# Patient Record
Sex: MALE | Race: WHITE | NOT HISPANIC OR LATINO | Employment: FULL TIME | URBAN - METROPOLITAN AREA
[De-identification: names, ages, dates, MRNs, and addresses within clinical notes are randomized per-mention and may not be internally consistent; named-entity substitution may affect disease eponyms.]

---

## 2022-05-03 ENCOUNTER — TELEPHONE (OUTPATIENT)
Dept: ORTHOPEDICS | Facility: CLINIC | Age: 37
End: 2022-05-03

## 2022-05-03 ENCOUNTER — PRE VISIT (OUTPATIENT)
Dept: ORTHOPEDICS | Facility: CLINIC | Age: 37
End: 2022-05-03

## 2022-05-03 ENCOUNTER — VIRTUAL VISIT (OUTPATIENT)
Dept: ORTHOPEDICS | Facility: CLINIC | Age: 37
End: 2022-05-03
Payer: COMMERCIAL

## 2022-05-03 DIAGNOSIS — D18.09 HEMANGIOMA OF OTHER SITES: Primary | ICD-10-CM

## 2022-05-03 PROCEDURE — 99202 OFFICE O/P NEW SF 15 MIN: CPT | Mod: TEL | Performed by: ORTHOPAEDIC SURGERY

## 2022-05-03 NOTE — LETTER
Date:May 8, 2022      Provider requested that no letter be sent. Do not send.       [facility]

## 2022-05-03 NOTE — PROGRESS NOTES
Patient is a 36-year-old male who was seen for a right thigh mass.  He noticed this in September 2021 when he was using his leg in a different way on the new job.  He describes it as a knot.  Since that time the knot has persistent and approximately once a month ago he feels perhaps the knot is enlarging.    He also recently has had hip and back pain.  This is being managed through his friend who is a chiropractor and has resolved.    In addition to the right thigh nodule he describes a left rib cage nodule and a nodule on his forehead.  He is interested in me evaluating all the nodules not just the right thigh nodule.    His past medical history as I can ascertain it from the chart as unremarkable.    I reviewed his x-rays and MRI scan and ultrasound as well as the reports for the MRI scan and the ultrasound.  In summary these are suspicious for my review for a benign vascular tumor involving the right vessels medialis muscle.  Sarcoma probably cannot be excluded based solely on imaging.    I recommended to Angel that he have an excisional biopsy of the right thigh mass.  He would like me to consider simultaneous excision of the left chest mass as well as a forehead mass.  I told him I could not remove the forehead mass but would be willing to evaluate him for the chest mass and a face-to-face encounter.  He is in agreement with this.    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    Plan: 1.  John Mathews or Gloria will contact the patient to set up an appointment to see me in the next 2 to 3 weeks on a Thursday clinic.  At that time we will discuss the proposed surgery for his right thigh and determine if we can excise the left chest nodule without advanced imaging.    This new patient visit began at 3:18 PM with me reviewing the image to 3:24 PM.  The visit then by telephone plus documentation was from 3:24 PM to 3:35 PM total visit  17 minutes.

## 2022-05-03 NOTE — TELEPHONE ENCOUNTER
RN called and spoke with :Javier.  NASIR set up an appt on  05-19-22 at 3:45 to discuss surgery going forward.

## 2022-05-03 NOTE — LETTER
5/3/2022         RE: Javier Blood  [address] [facility] [address]        Dear Colleague,    Thank you for referring your patient, Javier Blood, to the [facility] [place]. Please see a copy of my visit note below.    Patient is a 36-year-old male who was seen for a right thigh mass.  He noticed this in September 2021 when he was using his leg in a different way on the new job.  He describes it as a knot.  Since that time the knot has persistent and approximately once a month ago he feels perhaps the knot is enlarging.    He also recently has had hip and back pain.  This is being managed through his friend who is a chiropractor and has resolved.    In addition to the right thigh nodule he describes a left rib cage nodule and a nodule on his forehead.  He is interested in me evaluating all the nodules not just the right thigh nodule.    His past medical history as I can ascertain it from the chart as unremarkable.    I reviewed his x-rays and MRI scan and ultrasound as well as the reports for the MRI scan and the ultrasound.  In summary these are suspicious for my review for a benign vascular tumor involving the right vessels medialis muscle.  Sarcoma probably cannot be excluded based solely on imaging.    I recommended to Angel that he have an excisional biopsy of the right thigh mass.  He would like me to consider simultaneous excision of the left chest mass as well as a forehead mass.  I told him I could not remove the forehead mass but would be willing to evaluate him for the chest mass and a face-to-face encounter.  He is in agreement with this.    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    Plan: 1.  John Mathews or Gloria will contact the patient to set up an appointment to see me in the next 2 to 3 weeks on a Thursday clinic.  At that time we will discuss the proposed surgery for  his right thigh and determine if we can excise the left chest nodule without advanced imaging.    This new patient visit began at 3:18 PM with me reviewing the image to 3:24 PM.  The visit then by telephone plus documentation was from 3:24 PM to 3:35 PM total visit 17 minutes.          Again, thank you for allowing me to participate in the care of your patient.        Sincerely,        Boo Perez MD

## 2022-05-03 NOTE — PATIENT INSTRUCTIONS
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    Plan: 1.  John Mathews or Gloria will contact the patient to set up an appointment to see me in the next 2 to 3 weeks on a Thursday clinic.  At that time we will discuss the proposed surgery for his right thigh and determine if we can excise the left chest nodule without advanced imaging.

## 2022-05-03 NOTE — NURSING NOTE
Reason For Visit:   Chief Complaint   Patient presents with     Consult     Right vastus lateralis. Started a new job 9/2021, pt started a more physical job and thought the lump was just adjusting.        There were no vitals taken for this visit.    Pain Assessment  Patient Currently in Pain: Yes  0-10 Pain Scale: 5  Primary Pain Location: Leg    Susana Camejo ATC

## 2022-05-04 NOTE — TELEPHONE ENCOUNTER
DIAGNOSIS: Right vastus lateralis    APPOINTMENT DATE: 5/3/22   NOTES STATUS DETAILS   OFFICE NOTE from other specialist Care Everywhere 4/21/22 OV Carolina Vance PA-C  ([facility] )    3/25/22 urgent care note Negro West MD  ([facility])    MEDICATION LIST Care Everywhere    LABS     CBC/DIFF Care Everywhere 3/30/22   MRI Received 4/7/22 MR Femur    XRAYS (IMAGES & REPORTS) Received 4/21/22 XR Hip, Pelvis, Lumbar, Femur

## 2022-05-19 ENCOUNTER — PREP FOR PROCEDURE (OUTPATIENT)
Dept: ORTHOPEDICS | Facility: CLINIC | Age: 37
End: 2022-05-19

## 2022-05-19 ENCOUNTER — OFFICE VISIT (OUTPATIENT)
Dept: ORTHOPEDICS | Facility: CLINIC | Age: 37
End: 2022-05-19
Payer: COMMERCIAL

## 2022-05-19 DIAGNOSIS — D17.9 INTRAMUSCULAR LIPOMA: Primary | ICD-10-CM

## 2022-05-19 DIAGNOSIS — D18.09 HEMANGIOMA OF OTHER SITES: Primary | ICD-10-CM

## 2022-05-19 PROCEDURE — 99213 OFFICE O/P EST LOW 20 MIN: CPT | Performed by: ORTHOPAEDIC SURGERY

## 2022-05-19 NOTE — LETTER
Date:May 22, 2022      Provider requested that no letter be sent. Do not send.       [facility]

## 2022-05-19 NOTE — LETTER
5/19/2022         RE: Javier Blood  [address] [place] [address]        Dear Colleague,    Thank you for referring your patient, Javier Blood, to the [facility] [place]. Please see a copy of my visit note below.    Patient is a 36-year-old male who was recently interviewed by remote visit and was asked to come in today for physical exam and to discuss the recommendations.    In summary he was seen initially for mass in the right vastus lateralis muscle which is thought to be a benign vascular tumor however to my impression is not 100% clear on MRI imaging.  In addition during his initial visit he mentioned to right forehead lesion and a left flank lesion.    On physical exam today the mass in the thigh is palpable.  It is clinically consistent with a small tumor located deeply in the vastus lateralis muscle.  The left flank region is a tumor measuring approximately 5 cm and subcutaneous soft mobile and clinically consistent with a subcutaneous lipoma.  The lesion in the right forehead is approximately 1 cm in its subcutaneous incision the right side of the forehead is nontender to palpation is not erythematous.    I reviewed the MR scan with the patient and his wife.    We had a fairly lengthy discussion about the best management for the 3 tumors.  Regarding the forehead tumor I told him that there was outside of my specialty care but since it has not been enlarging I think observation would be reasonable.  Alternatively he could return to his primary care physician for a determination on surgeon who would excise that.    Regarding the left flank lipoma he would like the tumor removed he reports it is symptomatic.  His wife confirms this.    Regarding the right thigh tumor.  Is my recommendation that it be at a minimum biopsy Boo of maximum excised to establish the diagnosis.  After exploring the difference between an image guided needle biopsy and surgical  excision the patient and his wife favor proceeding with surgical excision.    Impression: 3 lesions.  The forehead lesion will be followed up through his primary clinic if it enlarges.  The left flank lesion will be removed at surgery as will the right thigh tumor.    Plan: 1.  Case request form completed.  We will excise the right thigh and left flank tumors    Although the patient and family's questions were answered.  Today's visit is an established patient and including review of imaging and documentation was greater than 20-minute      Again, thank you for allowing me to participate in the care of your patient.        Sincerely,        Boo Perez MD

## 2022-05-19 NOTE — NURSING NOTE
Teaching Flowsheet removal right thigh tumor. And removal left flank tumor.  Relevant Diagnosis:   Teaching Topic: preop     Person(s) involved in teaching:   Patient and Wife     Motivation Level:  Asks Questions: Yes  Eager to Learn: Yes  Cooperative: Yes  Receptive (willing/able to accept information): Yes  Any cultural factors/Orthodox beliefs that may influence understanding or compliance? No       Patient and Family demonstrates understanding of the following:  Reason for the appointment, diagnosis and treatment plan: Yes  Knowledge of proper use of medications and conditions for which they are ordered (with special attention to potential side effects or drug interactions): Yes  Which situations necessitate calling provider and whom to contact: Yes       Teaching Concerns Addressed:        Proper use and care of soap (medical equip, care aids, etc.): Yes  Nutritional needs and diet plan: Yes  Pain management techniques: Yes  Wound Care: Yes  How and/when to access community resources: Yes     Instructional Materials Used/Given: surgery packet reviewed with patient and his wife.  They will obtain H&P with PCP.  Spouse will  Drive him and take him home after surgery.  They have no other questions at this time.  Will await PAN and covid calls.

## 2022-05-19 NOTE — NURSING NOTE
Chief Complaint   Patient presents with     RECHECK     Followup right thigh // discuss surgery        36 year old  1985               Pain Assessment  Patient Currently in Pain: Yes  0-10 Pain Scale: 1  Primary Pain Location: Back (right lower back)             [id number] - [place], [place] - [address] [place], [place] [address] AT [facility]        No Known Allergies        Current Outpatient Medications   Medication     sertraline (ZOLOFT) 50 MG tablet     No current facility-administered medications for this visit.

## 2022-05-20 NOTE — PATIENT INSTRUCTIONS
Impression: 3 lesions.  The forehead lesion will be followed up through his primary clinic if it enlarges.  The left flank lesion will be removed at surgery as will the right thigh tumor.    Plan: 1.  Case request form completed.  We will excise the right thigh and left flank tumors

## 2022-05-20 NOTE — PROGRESS NOTES
Patient is a 36-year-old male who was recently interviewed by remote visit and was asked to come in today for physical exam and to discuss the recommendations.    In summary he was seen initially for mass in the right vastus lateralis muscle which is thought to be a benign vascular tumor however to my impression is not 100% clear on MRI imaging.  In addition during his initial visit he mentioned to right forehead lesion and a left flank lesion.    On physical exam today the mass in the thigh is palpable.  It is clinically consistent with a small tumor located deeply in the vastus lateralis muscle.  The left flank region is a tumor measuring approximately 5 cm and subcutaneous soft mobile and clinically consistent with a subcutaneous lipoma.  The lesion in the right forehead is approximately 1 cm in its subcutaneous incision the right side of the forehead is nontender to palpation is not erythematous.    I reviewed the MR scan with the patient and his wife.    We had a fairly lengthy discussion about the best management for the 3 tumors.  Regarding the forehead tumor I told him that there was outside of my specialty care but since it has not been enlarging I think observation would be reasonable.  Alternatively he could return to his primary care physician for a determination on surgeon who would excise that.    Regarding the left flank lipoma he would like the tumor removed he reports it is symptomatic.  His wife confirms this.    Regarding the right thigh tumor.  Is my recommendation that it be at a minimum biopsy Boo of maximum excised to establish the diagnosis.  After exploring the difference between an image guided needle biopsy and surgical excision the patient and his wife favor proceeding with surgical excision.    Impression: 3 lesions.  The forehead lesion will be followed up through his primary clinic if it enlarges.  The left flank lesion will be removed at surgery as will the right thigh  tumor.    Plan: 1.  Case request form completed.  We will excise the right thigh and left flank tumors    Although the patient and family's questions were answered.  Today's visit is an established patient and including review of imaging and documentation was greater than 20-minute

## 2022-05-23 ENCOUNTER — TELEPHONE (OUTPATIENT)
Dept: ORTHOPEDICS | Facility: CLINIC | Age: 37
End: 2022-05-23
Payer: COMMERCIAL

## 2022-05-23 NOTE — TELEPHONE ENCOUNTER
Called and left voicemail for patient about scheduling surgery with Dr. Perez. Gave [phone] as call back number.

## 2022-05-25 PROBLEM — D17.9 INTRAMUSCULAR LIPOMA: Status: ACTIVE | Noted: 2022-05-25

## 2022-05-25 NOTE — TELEPHONE ENCOUNTER
Patient is scheduled for surgery with Dr. Perez    Spoke with: Javier    Date of Surgery: 7/15/2022    Location: [facility]    Informed patient they will need an adult  yes    Pre op with Provider n/a    H&P: Scheduled with pcp    Pre-procedure COVID-19 Test: [place] on 7/11/2022    Additional imaging/appointments: n/a    Surgery packet: Given in clinic     Additional comments: n/a

## 2022-08-04 ENCOUNTER — ANESTHESIA EVENT (OUTPATIENT)
Dept: SURGERY | Facility: AMBULATORY SURGERY CENTER | Age: 37
End: 2022-08-04
Payer: COMMERCIAL

## 2022-08-05 ENCOUNTER — HOSPITAL ENCOUNTER (OUTPATIENT)
Facility: AMBULATORY SURGERY CENTER | Age: 37
Discharge: HOME | End: 2022-08-05
Attending: ORTHOPAEDIC SURGERY
Payer: COMMERCIAL

## 2022-08-05 ENCOUNTER — ANESTHESIA (OUTPATIENT)
Dept: SURGERY | Facility: AMBULATORY SURGERY CENTER | Age: 37
End: 2022-08-05
Payer: COMMERCIAL

## 2022-08-05 VITALS
BODY MASS INDEX: 25.62 KG/M2 | HEIGHT: 71 IN | TEMPERATURE: 97.4 F | HEART RATE: 64 BPM | DIASTOLIC BLOOD PRESSURE: 65 MMHG | RESPIRATION RATE: 12 BRPM | OXYGEN SATURATION: 96 % | WEIGHT: 183 LBS | SYSTOLIC BLOOD PRESSURE: 108 MMHG

## 2022-08-05 DIAGNOSIS — D17.9 INTRAMUSCULAR LIPOMA: Primary | ICD-10-CM

## 2022-08-05 PROCEDURE — 27339 EXC THIGH/KNEE TUM DEP 5CM/>: CPT | Mod: RT

## 2022-08-05 PROCEDURE — 88307 TISSUE EXAM BY PATHOLOGIST: CPT | Mod: TC | Performed by: ORTHOPAEDIC SURGERY

## 2022-08-05 PROCEDURE — 21931 EXC BACK LES SC 3 CM/>: CPT | Mod: LT

## 2022-08-05 PROCEDURE — 88307 TISSUE EXAM BY PATHOLOGIST: CPT | Mod: 26 | Performed by: PATHOLOGY

## 2022-08-05 PROCEDURE — 88304 TISSUE EXAM BY PATHOLOGIST: CPT | Mod: 26 | Performed by: PATHOLOGY

## 2022-08-05 PROCEDURE — 27339 EXC THIGH/KNEE TUM DEP 5CM/>: CPT | Mod: RT | Performed by: ORTHOPAEDIC SURGERY

## 2022-08-05 PROCEDURE — 88304 TISSUE EXAM BY PATHOLOGIST: CPT | Mod: TC | Performed by: ORTHOPAEDIC SURGERY

## 2022-08-05 PROCEDURE — 21931 EXC BACK LES SC 3 CM/>: CPT | Mod: LT | Performed by: ORTHOPAEDIC SURGERY

## 2022-08-05 RX ORDER — FENTANYL CITRATE 50 UG/ML
INJECTION, SOLUTION INTRAMUSCULAR; INTRAVENOUS PRN
Status: DISCONTINUED | OUTPATIENT
Start: 2022-08-05 | End: 2022-08-05

## 2022-08-05 RX ORDER — OXYCODONE HYDROCHLORIDE 5 MG/1
5 TABLET ORAL EVERY 4 HOURS PRN
Status: DISCONTINUED | OUTPATIENT
Start: 2022-08-05 | End: 2022-08-06 | Stop reason: HOSPADM

## 2022-08-05 RX ORDER — CEFAZOLIN SODIUM 2 G/50ML
2 SOLUTION INTRAVENOUS
Status: COMPLETED | OUTPATIENT
Start: 2022-08-05 | End: 2022-08-05

## 2022-08-05 RX ORDER — SODIUM CHLORIDE, SODIUM LACTATE, POTASSIUM CHLORIDE, CALCIUM CHLORIDE 600; 310; 30; 20 MG/100ML; MG/100ML; MG/100ML; MG/100ML
INJECTION, SOLUTION INTRAVENOUS CONTINUOUS
Status: DISCONTINUED | OUTPATIENT
Start: 2022-08-05 | End: 2022-08-05 | Stop reason: HOSPADM

## 2022-08-05 RX ORDER — AMOXICILLIN 250 MG
1-2 CAPSULE ORAL 2 TIMES DAILY
Qty: 30 TABLET | Refills: 0 | Status: SHIPPED | OUTPATIENT
Start: 2022-08-05

## 2022-08-05 RX ORDER — ACETAMINOPHEN 325 MG/1
650 TABLET ORAL
Status: DISCONTINUED | OUTPATIENT
Start: 2022-08-05 | End: 2022-08-06 | Stop reason: HOSPADM

## 2022-08-05 RX ORDER — ALBUTEROL SULFATE 0.83 MG/ML
2.5 SOLUTION RESPIRATORY (INHALATION) EVERY 4 HOURS PRN
Status: DISCONTINUED | OUTPATIENT
Start: 2022-08-05 | End: 2022-08-05 | Stop reason: HOSPADM

## 2022-08-05 RX ORDER — HYDROMORPHONE HYDROCHLORIDE 1 MG/ML
0.4 INJECTION, SOLUTION INTRAMUSCULAR; INTRAVENOUS; SUBCUTANEOUS EVERY 10 MIN PRN
Status: DISCONTINUED | OUTPATIENT
Start: 2022-08-05 | End: 2022-08-05 | Stop reason: HOSPADM

## 2022-08-05 RX ORDER — ONDANSETRON 4 MG/1
4 TABLET, ORALLY DISINTEGRATING ORAL EVERY 30 MIN PRN
Status: DISCONTINUED | OUTPATIENT
Start: 2022-08-05 | End: 2022-08-06 | Stop reason: HOSPADM

## 2022-08-05 RX ORDER — FENTANYL CITRATE 50 UG/ML
50 INJECTION, SOLUTION INTRAMUSCULAR; INTRAVENOUS EVERY 5 MIN PRN
Status: DISCONTINUED | OUTPATIENT
Start: 2022-08-05 | End: 2022-08-05 | Stop reason: HOSPADM

## 2022-08-05 RX ORDER — ACETAMINOPHEN 325 MG/1
650 TABLET ORAL EVERY 4 HOURS PRN
Qty: 100 TABLET | Refills: 0 | Status: SHIPPED | OUTPATIENT
Start: 2022-08-05

## 2022-08-05 RX ORDER — LIDOCAINE HYDROCHLORIDE 20 MG/ML
INJECTION, SOLUTION INFILTRATION; PERINEURAL PRN
Status: DISCONTINUED | OUTPATIENT
Start: 2022-08-05 | End: 2022-08-05

## 2022-08-05 RX ORDER — LIDOCAINE 40 MG/G
CREAM TOPICAL
Status: DISCONTINUED | OUTPATIENT
Start: 2022-08-05 | End: 2022-08-05 | Stop reason: HOSPADM

## 2022-08-05 RX ORDER — PROPOFOL 10 MG/ML
INJECTION, EMULSION INTRAVENOUS CONTINUOUS PRN
Status: DISCONTINUED | OUTPATIENT
Start: 2022-08-05 | End: 2022-08-05

## 2022-08-05 RX ORDER — BUPIVACAINE HYDROCHLORIDE AND EPINEPHRINE 5; 5 MG/ML; UG/ML
INJECTION, SOLUTION PERINEURAL PRN
Status: DISCONTINUED | OUTPATIENT
Start: 2022-08-05 | End: 2022-08-05 | Stop reason: HOSPADM

## 2022-08-05 RX ORDER — GABAPENTIN 300 MG/1
300 CAPSULE ORAL
Status: COMPLETED | OUTPATIENT
Start: 2022-08-05 | End: 2022-08-05

## 2022-08-05 RX ORDER — ONDANSETRON 2 MG/ML
4 INJECTION INTRAMUSCULAR; INTRAVENOUS EVERY 30 MIN PRN
Status: DISCONTINUED | OUTPATIENT
Start: 2022-08-05 | End: 2022-08-06 | Stop reason: HOSPADM

## 2022-08-05 RX ORDER — ACETAMINOPHEN 325 MG/1
975 TABLET ORAL ONCE
Status: COMPLETED | OUTPATIENT
Start: 2022-08-05 | End: 2022-08-05

## 2022-08-05 RX ORDER — OXYCODONE HYDROCHLORIDE 5 MG/1
5 TABLET ORAL EVERY 6 HOURS PRN
Qty: 15 TABLET | Refills: 0 | Status: SHIPPED | OUTPATIENT
Start: 2022-08-05 | End: 2022-08-08

## 2022-08-05 RX ORDER — KETOROLAC TROMETHAMINE 30 MG/ML
INJECTION, SOLUTION INTRAMUSCULAR; INTRAVENOUS PRN
Status: DISCONTINUED | OUTPATIENT
Start: 2022-08-05 | End: 2022-08-05

## 2022-08-05 RX ORDER — OXYCODONE HYDROCHLORIDE 5 MG/1
5 TABLET ORAL
Status: COMPLETED | OUTPATIENT
Start: 2022-08-05 | End: 2022-08-05

## 2022-08-05 RX ORDER — PROPOFOL 10 MG/ML
INJECTION, EMULSION INTRAVENOUS PRN
Status: DISCONTINUED | OUTPATIENT
Start: 2022-08-05 | End: 2022-08-05

## 2022-08-05 RX ORDER — EPHEDRINE SULFATE 50 MG/ML
INJECTION, SOLUTION INTRAMUSCULAR; INTRAVENOUS; SUBCUTANEOUS PRN
Status: DISCONTINUED | OUTPATIENT
Start: 2022-08-05 | End: 2022-08-05

## 2022-08-05 RX ORDER — CEFAZOLIN SODIUM 2 G/50ML
2 SOLUTION INTRAVENOUS SEE ADMIN INSTRUCTIONS
Status: DISCONTINUED | OUTPATIENT
Start: 2022-08-05 | End: 2022-08-05 | Stop reason: HOSPADM

## 2022-08-05 RX ORDER — ONDANSETRON 2 MG/ML
INJECTION INTRAMUSCULAR; INTRAVENOUS PRN
Status: DISCONTINUED | OUTPATIENT
Start: 2022-08-05 | End: 2022-08-05

## 2022-08-05 RX ORDER — DEXAMETHASONE SODIUM PHOSPHATE 4 MG/ML
INJECTION, SOLUTION INTRA-ARTICULAR; INTRALESIONAL; INTRAMUSCULAR; INTRAVENOUS; SOFT TISSUE PRN
Status: DISCONTINUED | OUTPATIENT
Start: 2022-08-05 | End: 2022-08-05

## 2022-08-05 RX ORDER — HALOPERIDOL 5 MG/ML
1 INJECTION INTRAMUSCULAR
Status: DISCONTINUED | OUTPATIENT
Start: 2022-08-05 | End: 2022-08-06 | Stop reason: HOSPADM

## 2022-08-05 RX ORDER — METHOCARBAMOL 750 MG/1
750 TABLET, FILM COATED ORAL
Status: DISCONTINUED | OUTPATIENT
Start: 2022-08-05 | End: 2022-08-06 | Stop reason: HOSPADM

## 2022-08-05 RX ORDER — DEXMEDETOMIDINE HYDROCHLORIDE 4 UG/ML
INJECTION, SOLUTION INTRAVENOUS PRN
Status: DISCONTINUED | OUTPATIENT
Start: 2022-08-05 | End: 2022-08-05

## 2022-08-05 RX ADMIN — EPHEDRINE SULFATE 10 MG: 50 INJECTION, SOLUTION INTRAMUSCULAR; INTRAVENOUS; SUBCUTANEOUS at 13:37

## 2022-08-05 RX ADMIN — ACETAMINOPHEN 975 MG: 325 TABLET ORAL at 11:17

## 2022-08-05 RX ADMIN — PROPOFOL 200 MG: 10 INJECTION, EMULSION INTRAVENOUS at 13:01

## 2022-08-05 RX ADMIN — CEFAZOLIN SODIUM 2 G: 2 SOLUTION INTRAVENOUS at 12:53

## 2022-08-05 RX ADMIN — Medication 50 MG: at 13:02

## 2022-08-05 RX ADMIN — PROPOFOL 50 MG: 10 INJECTION, EMULSION INTRAVENOUS at 13:30

## 2022-08-05 RX ADMIN — Medication 100 MCG: at 13:21

## 2022-08-05 RX ADMIN — DEXMEDETOMIDINE HYDROCHLORIDE 8 MCG: 4 INJECTION, SOLUTION INTRAVENOUS at 13:23

## 2022-08-05 RX ADMIN — PROPOFOL 200 MCG/KG/MIN: 10 INJECTION, EMULSION INTRAVENOUS at 13:05

## 2022-08-05 RX ADMIN — FENTANYL CITRATE 25 MCG: 50 INJECTION, SOLUTION INTRAMUSCULAR; INTRAVENOUS at 13:00

## 2022-08-05 RX ADMIN — DEXMEDETOMIDINE HYDROCHLORIDE 8 MCG: 4 INJECTION, SOLUTION INTRAVENOUS at 14:05

## 2022-08-05 RX ADMIN — ONDANSETRON 4 MG: 2 INJECTION INTRAMUSCULAR; INTRAVENOUS at 13:20

## 2022-08-05 RX ADMIN — LIDOCAINE HYDROCHLORIDE 100 MG: 20 INJECTION, SOLUTION INFILTRATION; PERINEURAL at 13:01

## 2022-08-05 RX ADMIN — FENTANYL CITRATE 50 MCG: 50 INJECTION, SOLUTION INTRAMUSCULAR; INTRAVENOUS at 15:33

## 2022-08-05 RX ADMIN — KETOROLAC TROMETHAMINE 30 MG: 30 INJECTION, SOLUTION INTRAMUSCULAR; INTRAVENOUS at 14:35

## 2022-08-05 RX ADMIN — DEXAMETHASONE SODIUM PHOSPHATE 4 MG: 4 INJECTION, SOLUTION INTRA-ARTICULAR; INTRALESIONAL; INTRAMUSCULAR; INTRAVENOUS; SOFT TISSUE at 13:20

## 2022-08-05 RX ADMIN — SODIUM CHLORIDE, SODIUM LACTATE, POTASSIUM CHLORIDE, CALCIUM CHLORIDE: 600; 310; 30; 20 INJECTION, SOLUTION INTRAVENOUS at 11:32

## 2022-08-05 RX ADMIN — DEXMEDETOMIDINE HYDROCHLORIDE 4 MCG: 4 INJECTION, SOLUTION INTRAVENOUS at 13:27

## 2022-08-05 RX ADMIN — Medication 100 MCG: at 13:35

## 2022-08-05 RX ADMIN — FENTANYL CITRATE 50 MCG: 50 INJECTION, SOLUTION INTRAMUSCULAR; INTRAVENOUS at 15:24

## 2022-08-05 RX ADMIN — OXYCODONE HYDROCHLORIDE 5 MG: 5 TABLET ORAL at 15:31

## 2022-08-05 RX ADMIN — PROPOFOL 50 MG: 10 INJECTION, EMULSION INTRAVENOUS at 13:55

## 2022-08-05 RX ADMIN — Medication 100 MCG: at 13:26

## 2022-08-05 RX ADMIN — FENTANYL CITRATE 50 MCG: 50 INJECTION, SOLUTION INTRAMUSCULAR; INTRAVENOUS at 13:13

## 2022-08-05 RX ADMIN — FENTANYL CITRATE 25 MCG: 50 INJECTION, SOLUTION INTRAMUSCULAR; INTRAVENOUS at 13:08

## 2022-08-05 RX ADMIN — GABAPENTIN 300 MG: 300 CAPSULE ORAL at 11:16

## 2022-08-05 NOTE — OR NURSING
Patient Javier Blood has been under the care of the [facility]. He may return to work August 11, 2022.     Sincerely,    Dr. Boo Perez, Orthopaedics: [phone]    Karina Sigala RN, [facility]

## 2022-08-05 NOTE — ANESTHESIA CARE TRANSFER NOTE
Patient: Javier Blood    Procedure: Procedure(s):  removal right thigh tumor. And removal left flank tumor.       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    Anesthesia Type:   General     Note:    Oropharynx: oropharynx clear of all foreign objects and spontaneously breathing  Level of Consciousness: drowsy  Oxygen Supplementation: face mask  Level of Supplemental Oxygen (L/min / FiO2): 6  Independent Airway: airway patency satisfactory and stable  Dentition: dentition unchanged  Vital Signs Stable: post-procedure vital signs reviewed and stable  Report to RN Given: handoff report given  Patient transferred to: PACU    Handoff Report: Identifed the Patient, Identified the Reponsible Provider, Reviewed the pertinent medical history, Discussed the surgical course, Reviewed Intra-OP anesthesia mangement and issues during anesthesia, Set expectations for post-procedure period and Allowed opportunity for questions and acknowledgement of understanding      Vitals:  Vitals Value Taken Time   /59 08/05/22 1500   Temp 36.5  C (97.7  F) 08/05/22 1500   Pulse 70 08/05/22 1500   Resp 13 08/05/22 1500   SpO2 99 % 08/05/22 1500       Electronically Signed By: MILEY Medel CRNA  August 5, 2022  3:01 PM

## 2022-08-05 NOTE — BRIEF OP NOTE
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 And removal left flank tumor.  Surgeon: Surgeon(s) and Role:     * Boo Perez MD - Primary     * Herbert Calderon MD - Resident - Assisting  Anesthesia: General   Estimated Blood Loss: 100 mL from 8/5/2022 12:56 PM to 8/5/2022  2:57 PM      Drains: None  Specimens:   ID Type Source Tests Collected by Time Destination   1 : Tumor Left Flank Tissue Other SURGICAL PATHOLOGY EXAM Boo Perez MD 8/5/2022  1:21 PM    2 : Tumor Right thigh Tissue Thigh, Right SURGICAL PATHOLOGY EXAM Boo Perez MD 8/5/2022  1:22 PM      Findings:   See operative note.  Complications: None.  Implants: * No implants in log *    SDS  PO pain control- acetaminophen, oxycodone  Rest, ice, elevation  WBAT  Dressings until POD5; may shower  Home per PACU  F/u 2 weeks    Herbert Calderon MD  Orthopaedic Surgery PGY-4

## 2022-08-05 NOTE — DISCHARGE INSTRUCTIONS
[facility]  Home Care Following Anesthesia  For 24 hours after surgery:  Get plenty of rest.  A responsible adult must stay with you for at least 24 hours after you leave the surgery center.  Do not drive or use heavy equipment.  If you have weakness or tingling, don't drive or use heavy equipment until this feeling goes away.   Do not drink alcohol.   Avoid strenuous or risky activities.  Ask for help when climbing stairs.  You may feel lightheaded.  IF so, sit for a few minutes before standing.  Have someone help you get up.   If you have nausea (feel sick to your stomach): Drink only clear liquids such as apple juice, ginger ale, broth or 7-Up.  Rest may also help.  Be sure to drink enough fluids.  Move to a regular diet as you feel able.   You may have a slight fever.  Call the doctor if your fever is over 100 F (37.7 C) (taken under the tongue) or lasts longer than 24 hours.  You may have a dry mouth, a sore throat, muscle aches or trouble sleeping. These should go away after 24 hours.  Do not make important or legal decisions.   It is recommended to avoid smoking.               Tips for taking pain medications  To get the best pain relief possible, remember these points:  Take pain medications as directed, before pain becomes severe.  Pain medication can upset your stomach: taking it with food may help.  Constipation is a common side effect of pain medication. Drink plenty of  fluids.  Eat foods high in fiber. Take a stool softener if recommended by your doctor or pharmacist.  Do not drink alcohol, drive or operate machinery while taking pain medications.  Ask about other ways to control pain, such as with heat, ice or relaxation.    Tylenol/Acetaminophen Consumption  To help encourage the safe use of acetaminophen, the makers of TYLENOL  have lowered the maximum daily dose for single-ingredient Extra Strength TYLENOL  (acetaminophen) products sold in the [place] from 8 pills  per day (4,000 mg) to 6 pills per day (3,000 mg). The dosing interval has also changed from 2 pills every 4-6 hours to 2 pills every 6 hours.  If you feel your pain relief is insufficient, you may take Tylenol/Acetaminophen in addition to your narcotic pain medication.   Be careful not to exceed 3,000 mg of Tylenol/Acetaminophen in a 24 hour period from all sources.  If you are taking extra strength Tylenol/acetaminophen (500 mg), the maximum dose is 6 tablets in 24 hours.  If you are taking regular strength acetaminophen (325 mg), the maximum dose is 9 tablets in 24 hours.  You took 975mg of tylenol at 11:15 am. You can take additional tylenol after 5:15 pm.    Call a doctor for any of the following:  Signs of infection (fever, growing tenderness at the surgery site, a large amount of drainage or bleeding, severe pain, foul-smelling drainage, redness, swelling).  It has been over 8 to 10 hours since surgery and you are still not able to urinate (pass water).  Headache for over 24 hours.  Numbness, tingling or weakness the day after surgery (if you had spinal anesthesia).  Signs of Covid-19 infection (temperature over 100 degrees, shortness of breath, cough, loss of taste/smell, generalized body aches, persistent headache, chills, sore throat, nausea/vomiting/diarrhea)  Your doctor is:  Dr. Boo Perez, Orthopaedics: [phone]                    Or dial [phone] and ask for the resident on call for:  Orthopaedics  For emergency care, call the:  [facility] Emergency Department: [phone] (TTY for hearing impaired: [phone])

## 2022-08-05 NOTE — INTERVAL H&P NOTE
"I have reviewed the surgical (or preoperative) H&P that is linked to this encounter, and examined the patient. There are no significant changes    Clinical Conditions Present on Arrival:  Clinically Significant Risk Factors Present on Admission                   # Overweight: Estimated body mass index is 25.52 kg/m  as calculated from the following:    Height as of this encounter: 1.803 m (5' 11\").    Weight as of this encounter: 83 kg (183 lb).       "

## 2022-08-05 NOTE — OP NOTE
Preop diagnosis: 1.  Fatty tumor left flank.  9 cm, subcutaneous   2.  Vascular tumor right thigh, 4 cm comment the    Postoperative diagnosis 1.  Fatty tumor left.  2.  Vascular tumor right thigh    Procedures performed: 1.  Removal of left flank tumor.  2.  Removal of right thigh    Surgeons: Boo Perez is Herbert Calderon    Estimated blood loss 100 cc    Pathology submitted: 1.  Tumor left flank.  2.  Tumor right thigh.    Patient was interviewed in the preoperative area.  Both surgical sites were marked my initials and line of intended incision.  Consent was signed.    Preoperative brief was performed.  Patient was taken the operating room he received a general anesthetic in the lateral position with the left left flank was prepped and draped sterilely.  Surgical timeout 5 inch incision was made directly over the left leg tumor.  Sharp dissection was taken down through skin and subcutaneous tissue.  The tumor was removed from the subcutaneous tissue.  It measured approximately 9 cm in greatest.  It is fatty in nature was submitted in formalin for histology.  The wound was closed in a standard fashion.  Postoperative debrief was performed.    The patient was then placed supine.  The right lower extremity was prepped and draped sterilely.  The surgical site had been marked.  Surgical timeout was taken performed  A 4 inch incision was made directly over the area of suspect muscle.  The tumor was identified lying on the superficial fascia of the vastus intermedius muscle.  It was circumferentially dissected free of soft tissue.  The tumor was entered at 1 point which caused some bleeding.  The bleeding was all muscular attachments to the tumor were incised and tumor was lifted irrigated and closed in a standard fashion.  Postoperative debrief was performed.  The tumor was submitted as tumor right thigh in formalin.  The tumor and the gross appearance of a benign vascular tumor.  Postoperative debrief was  performed.    Postoperative plan.  1.  We will see the patient back in clinic as scheduled.  2.  We will call him with results of the histopathology.

## 2022-08-05 NOTE — ANESTHESIA PREPROCEDURE EVALUATION
Anesthesia Pre-Procedure Evaluation    Patient: Javier Blood   MRN: [id number] : 1985        Procedure : Procedure(s):  removal right thigh tumor. And removal left flank tumor.          History reviewed. No pertinent past medical history.   No past surgical history on file.   No Known Allergies   Social History     Tobacco Use     Smoking status: Not on file     Smokeless tobacco: Not on file   Substance Use Topics     Alcohol use: Not on file      Wt Readings from Last 1 Encounters:   22 83 kg (183 lb)        Anesthesia Evaluation            ROS/MED HX  ENT/Pulmonary:  - neg pulmonary ROS     Neurologic:  - neg neurologic ROS     Cardiovascular:  - neg cardiovascular ROS     METS/Exercise Tolerance:     Hematologic:  - neg hematologic  ROS     Musculoskeletal:       GI/Hepatic:  - neg GI/hepatic ROS     Renal/Genitourinary:  - neg Renal ROS     Endo:  - neg endo ROS     Psychiatric/Substance Use:  - neg psychiatric ROS     Infectious Disease:  - neg infectious disease ROS     Malignancy:  - neg malignancy ROS     Other:               OUTSIDE LABS:  CBC: No results found for: WBC, HGB, HCT, PLT  BMP: No results found for: NA, POTASSIUM, CHLORIDE, CO2, BUN, CR, GLC  COAGS: No results found for: PTT, INR, FIBR  POC: No results found for: BGM, HCG, HCGS  HEPATIC: No results found for: ALBUMIN, PROTTOTAL, ALT, AST, GGT, ALKPHOS, BILITOTAL, BILIDIRECT, FRANKLIN  OTHER: No results found for: PH, LACT, A1C, MACEY, PHOS, MAG, LIPASE, AMYLASE, TSH, T4, T3, CRP, SED    Anesthesia Plan    ASA Status:  1      Anesthesia Type: General.     - Airway: LMA              Consents    Anesthesia Plan(s) and associated risks, benefits, and realistic alternatives discussed. Questions answered and patient/representative(s) expressed understanding.     - Discussed: Risks, Benefits and Alternatives for BOTH SEDATION and the PROCEDURE were discussed     - Discussed with:  Patient      - Extended Intubation/Ventilatory Support  Discussed: No.      - Patient is DNR/DNI Status: No    Use of blood products discussed: No .     Postoperative Care    Pain management: Oral pain medications.   PONV prophylaxis: Ondansetron (or other 5HT-3), Dexamethasone or Solumedrol     Comments:                Yevgeniy Boston MD, MD

## 2022-08-05 NOTE — ANESTHESIA POSTPROCEDURE EVALUATION
Patient: Javier Blood    Procedure: Procedure(s):  removal right thigh tumor. And removal left flank tumor.       Anesthesia Type:  General    Note:  Disposition: Outpatient   Postop Pain Control: Uneventful            Sign Out: Well controlled pain   PONV: No   Neuro/Psych: Uneventful            Sign Out: Acceptable/Baseline neuro status   Airway/Respiratory: Uneventful            Sign Out: Acceptable/Baseline resp. status   CV/Hemodynamics: Uneventful            Sign Out: Acceptable CV status   Other NRE: NONE   DID A NON-ROUTINE EVENT OCCUR? No           Last vitals:  Vitals Value Taken Time   /59 08/05/22 1540   Temp 36.4  C (97.6  F) 08/05/22 1540   Pulse 64 08/05/22 1542   Resp 13 08/05/22 1542   SpO2 91 % 08/05/22 1542   Vitals shown include unvalidated device data.    Electronically Signed By: Humza Tracey DO  August 5, 2022  3:47 PM

## 2022-08-10 LAB
PATH REPORT.COMMENTS IMP SPEC: NORMAL
PATH REPORT.FINAL DX SPEC: NORMAL
PATH REPORT.GROSS SPEC: NORMAL
PATH REPORT.MICROSCOPIC SPEC OTHER STN: NORMAL
PATH REPORT.RELEVANT HX SPEC: NORMAL
PHOTO IMAGE: NORMAL

## 2022-08-16 ENCOUNTER — TELEPHONE (OUTPATIENT)
Dept: ORTHOPEDICS | Facility: CLINIC | Age: 37
End: 2022-08-16

## 2022-08-16 DIAGNOSIS — D17.9 INTRAMUSCULAR LIPOMA: Primary | ICD-10-CM

## 2022-08-16 RX ORDER — CEPHALEXIN 500 MG/1
500 CAPSULE ORAL 3 TIMES DAILY
Qty: 15 CAPSULE | Refills: 0 | Status: SHIPPED | OUTPATIENT
Start: 2022-08-16 | End: 2022-08-21

## 2022-08-16 NOTE — TELEPHONE ENCOUNTER
[facility] Call Center    Phone Message    May a detailed message be left on voicemail: yes     Reason for Call: Other: Patient has concerns for possible infection as his incision on his thigh is red, does not appear as good as his incision ion his side -- he is in a lot of pain at night and night sweats      Action Taken: Message routed to:  [facility] ([facility] ortho    Travel Screening: Not Applicable     Please c/b to discuss his symptoms and if he needs to come in

## 2022-08-16 NOTE — TELEPHONE ENCOUNTER
ATC called pt to gather more information. Pt reported redness around the incision with a little warmth. His pain level is about a 4/10 worse with bending the knee. He reported no drainage and fever. He has been taken his medications as directed. He mentioned that he has been waking up and sweating at night every night for the past five days. He was wondering if this is normal. ATC informed that ATC will relay this to Dr. Perez's team. The pt verified understanding.           -John ATC- Orthopedics

## 2022-08-16 NOTE — TELEPHONE ENCOUNTER
RN returned call to Javier.  He states that he has umu having night sweats, but not running a fever.  He is not able to send a picture via My chart so he will email it to me.  Incision red, tight when tries to bend it, but ok with normal gait.  He is done with his narcs and using Tylenol only.  He may have a stitch abcess.  RN will review with MD and if he needs antibiotics, [facility] in [place] or [facility] on  [address].  RN will review with MD/PA in clinic and call with plan

## 2022-08-17 ENCOUNTER — TELEPHONE (OUTPATIENT)
Dept: ORTHOPEDICS | Facility: CLINIC | Age: 37
End: 2022-08-17

## 2022-08-17 NOTE — TELEPHONE ENCOUNTER
RN called and spoke with Javier.  He was informed that antibiotics were called in for him.  He should start them today and keep his appt next week with us. He should notice an improvement.

## 2022-08-23 ENCOUNTER — VIRTUAL VISIT (OUTPATIENT)
Dept: ORTHOPEDICS | Facility: CLINIC | Age: 37
End: 2022-08-23
Payer: COMMERCIAL

## 2022-08-23 DIAGNOSIS — D18.09 HEMANGIOMA OF OTHER SITES: Primary | ICD-10-CM

## 2022-08-23 PROCEDURE — 99024 POSTOP FOLLOW-UP VISIT: CPT | Mod: 95 | Performed by: ORTHOPAEDIC SURGERY

## 2022-08-23 NOTE — NURSING NOTE
Javier Blood  is being evaluated via a billable video visit.      How would you like to obtain your AVS? [employer]  For the video visit, send the invitation by: Text to cell phone: [phone]  Will anyone else be joining your video visit? No

## 2022-08-23 NOTE — PROGRESS NOTES
Diagnosis: 1.  Lipoma left flank.  2.  Venous malformation right quadriceps muscle    Treatment: 1.  Surgical excision.  August 2022.  2.  Surgical excision.  August 2022    I had video visit with Javier.  All overall he is doing as would be expected.  He reports he is recovered more swiftly from the left flank surgery than the right thigh surgery.  By this he means his right knee is still not returned to normal motion and he has some swelling in the right thigh.    I did examine his wounds by video.  The left flank wound had a small amount of ecchymosis but overall looked good.  The right thigh wound has some ecchymosis and it looks like a subcutaneous stitch that got close to the dermis.  I reassured him that this is a dissolving stitch.  He did appear to have a hematoma beneath the right thigh wound but the wound was dry.    Impression: 1.  Overall doing as well as can be expected.  Needs physical therapy for the right quadricep strengthening and right knee motion.  2.  Bisi to contact the patient to arrange for physical therapy close to home.  Evaluate and treat right quadricep strengthening right hip abduction strengthening and right knee motion    This postoperative visit began at 1:37 PM and ended at 1:48 PM the total visit was 11 minutes.

## 2022-08-23 NOTE — LETTER
Date:August 24, 2022      Provider requested that no letter be sent. Do not send.       [facility]

## 2022-08-23 NOTE — LETTER
8/23/2022         RE: Javier Blood  [address] [place] [address]        Dear Colleague,    Thank you for referring your patient, Javier Blood, to the [facility] [place]. Please see a copy of my visit note below.    Diagnosis: 1.  Lipoma left flank.  2.  Venous malformation right quadriceps muscle    Treatment: 1.  Surgical excision.  August 2022.  2.  Surgical excision.  August 2022    I had video visit with Javier.  All overall he is doing as would be expected.  He reports he is recovered more swiftly from the left flank surgery than the right thigh surgery.  By this he means his right knee is still not returned to normal motion and he has some swelling in the right thigh.    I did examine his wounds by video.  The left flank wound had a small amount of ecchymosis but overall looked good.  The right thigh wound has some ecchymosis and it looks like a subcutaneous stitch that got close to the dermis.  I reassured him that this is a dissolving stitch.  He did appear to have a hematoma beneath the right thigh wound but the wound was dry.    Impression: 1.  Overall doing as well as can be expected.  Needs physical therapy for the right quadricep strengthening and right knee motion.  2.  [facility] to contact the patient to arrange for physical therapy close to home.  Evaluate and treat right quadricep strengthening right hip abduction strengthening and right knee motion    This postoperative visit began at 1:37 PM and ended at 1:48 PM the total visit was 11 minutes.      Again, thank you for allowing me to participate in the care of your patient.        Sincerely,        Boo Perez MD

## 2022-09-07 ENCOUNTER — THERAPY VISIT (OUTPATIENT)
Dept: PHYSICAL THERAPY | Facility: CLINIC | Age: 37
End: 2022-09-07
Attending: ORTHOPAEDIC SURGERY
Payer: COMMERCIAL

## 2022-09-07 DIAGNOSIS — Z47.89 AFTERCARE FOLLOWING SURGERY OF THE MUSCULOSKELETAL SYSTEM: ICD-10-CM

## 2022-09-07 DIAGNOSIS — M25.561 ACUTE PAIN OF RIGHT KNEE: ICD-10-CM

## 2022-09-07 DIAGNOSIS — D18.09 HEMANGIOMA OF OTHER SITES: ICD-10-CM

## 2022-09-07 PROCEDURE — 97161 PT EVAL LOW COMPLEX 20 MIN: CPT | Mod: GP | Performed by: PHYSICAL THERAPIST

## 2022-09-07 PROCEDURE — 97110 THERAPEUTIC EXERCISES: CPT | Mod: GP | Performed by: PHYSICAL THERAPIST

## 2022-09-07 ASSESSMENT — ACTIVITIES OF DAILY LIVING (ADL)
STIFFNESS: THE SYMPTOM AFFECTS MY ACTIVITY MODERATELY
GO UP STAIRS: ACTIVITY IS FAIRLY DIFFICULT
HOW_WOULD_YOU_RATE_THE_CURRENT_FUNCTION_OF_YOUR_KNEE_DURING_YOUR_USUAL_DAILY_ACTIVITIES_ON_A_SCALE_FROM_0_TO_100_WITH_100_BEING_YOUR_LEVEL_OF_KNEE_FUNCTION_PRIOR_TO_YOUR_INJURY_AND_0_BEING_THE_INABILITY_TO_PERFORM_ANY_OF_YOUR_USUAL_DAILY_ACTIVITIES?: 70
SWELLING: THE SYMPTOM AFFECTS MY ACTIVITY MODERATELY
WEAKNESS: THE SYMPTOM AFFECTS MY ACTIVITY MODERATELY
SQUAT: ACTIVITY IS FAIRLY DIFFICULT
KNEE_ACTIVITY_OF_DAILY_LIVING_SCORE: 41.43
GIVING WAY, BUCKLING OR SHIFTING OF KNEE: THE SYMPTOM AFFECTS MY ACTIVITY MODERATELY
AS_A_RESULT_OF_YOUR_KNEE_INJURY,_HOW_WOULD_YOU_RATE_YOUR_CURRENT_LEVEL_OF_DAILY_ACTIVITY?: ABNORMAL
KNEE_ACTIVITY_OF_DAILY_LIVING_SUM: 29
RAW_SCORE: 29
SIT WITH YOUR KNEE BENT: ACTIVITY IS FAIRLY DIFFICULT
HOW_WOULD_YOU_RATE_THE_OVERALL_FUNCTION_OF_YOUR_KNEE_DURING_YOUR_USUAL_DAILY_ACTIVITIES?: ABNORMAL
GO DOWN STAIRS: ACTIVITY IS FAIRLY DIFFICULT
KNEEL ON THE FRONT OF YOUR KNEE: ACTIVITY IS FAIRLY DIFFICULT
LIMPING: THE SYMPTOM AFFECTS MY ACTIVITY SEVERELY
PAIN: THE SYMPTOM AFFECTS MY ACTIVITY MODERATELY
STAND: ACTIVITY IS MINIMALLY DIFFICULT
WALK: ACTIVITY IS FAIRLY DIFFICULT
RISE FROM A CHAIR: ACTIVITY IS FAIRLY DIFFICULT

## 2022-09-07 NOTE — PROGRESS NOTES
Physical Therapy Initial Evaluation  Subjective:  The history is provided by the patient. No  was used.   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  I had made some progress with walking and range of motion. Last weekend I was up at the cabin doing a few more things and had increase in soreness and swelling.  Yesterday at work, I had a little trip which aggravated the thigh.  .         Type of problem:  Right knee (right thigh).    This is a new condition.  Condition occurred with:  Insidious onset.  Where injured: surgery.  Patient reports pain:  In the joint, anterior and lateral (hip joint, right thigh ).  Pain quality: tightness, with explosive feeling, pressure in the tigh,  achiness in the right knee  and is constant.  Pain radiates to:  Hip, thigh and knee. Pain is worse in the P.M. and worse in the A.M..  Since onset symptoms are gradually improving.  Associated symptoms:  Edema and loss of motion/stiffness. Symptoms are exacerbated by bending/squatting, weight bearing, standing, descending stairs and ascending stairs (bending)  and relieved by ice and rest.    Past treatment: none.   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  Home/work barriers: house, rambler 10 month old and 4 year old.    Patient Health History  Javier Blood being seen for right thigh pain .     Problem began: 8/5/2022.   Problem occurred: tumor removal    Pain is reported as 6/10 on pain scale.  General health as reported by patient is good.  Pertinent medical history includes: none.         Other surgery history details: right thumb, left side with lipoma removal .    Current medications:  Anti-inflammatory.    Current occupation is producation/assembly line.   Primary job tasks include:  Operating a machine/assembly, prolonged standing,  repetitive tasks and pushing/pulling.   Other job/home tasks details: 2 small children at home. .                                  Objective:  Standing Alignment:    Cervical/Thoracic:  Forward head (sitting posture poor, knee extended )  Shoulder/UE:  Rounded shoulders    Pelvic:  Normal          Gait:    Weight Bearing Status:  WBAT   Assistive Devices:  None  Deviations:  Knee:  Knee extension decr R and knee flexion decr LAnkle:  Heel strike decr R and push off decr R    Flexibility/Screens:   Positive screens:  Knee (right thigh)Negative screens: Hip (tightness)     Lower Extremity:      Decreased right lower extremity flexibility:  Hip ER's; Adductors; Hip Flexors; IT Band; Quadriceps and Hamstrings                                                 Hip Evaluation  Hip PROM:    Flexion: Left: WFL   Right: 100    Abduction: Left: WFL    Right: slightly limited    Internal Rotation: Left: 15    Right: 5 with tightness  External Rotation: Left: 60    Right: 30 with tightness                         Knee Evaluation:  ROM:    AROM      Extension:  Left: 5    Right:  20  Flexion: Left: 128    Right: 83  PROM    Hyperextension: Left: 2    Right:  0  Extension: Left: 0    Right:  0  Flexion: Left: 140    Right:  85    Endfeel: hip flexors 5/5 bilateral   Strength:     Extension:  Left: 5/5   Pain:      Right: 4+/5    Pain:+/-  Flexion:  Left: 5/5   Pain:      Right: 5/5   Pain:    Quad Set Left: Good    Pain:   Quad Set Right: Fair    Pain:  Ligament Testing:  Not Assessed                Special Tests: Not Assessed      Palpation:  Palpation of knee: lateral quad, lateral thigh on right     Right knee tenderness present at:  Incisional  Edema:  Edema of the knee: general right lateral thigh swelling             General     ROS    Assessment/Plan:    Patient is a 37 year old male with right side knee/ thigh complaints.    Patient has the following significant findings with corresponding treatment plan.                 Diagnosis 1:  Hemangioma removal, 8/5/2022  Pain -  manual therapy, self management, education and home program  Decreased ROM/flexibility - manual therapy, therapeutic exercise, therapeutic activity and home program  Decreased strength - therapeutic exercise, therapeutic activities and home program  Edema - cold therapy and self management/home program  Impaired gait - gait training and home program  Impaired muscle performance - neuro re-education and home program  Decreased function - therapeutic activities and home program  Impaired posture - neuro re-education, therapeutic activities and home program    Therapy Evaluation Codes:   Cumulative Therapy Evaluation is: Low complexity.    Previous and current functional limitations:  (See Goal Flow Sheet for this information)    Short term and Long term goals: (See Goal Flow Sheet for this information)     Communication ability:  Patient appears to be able to clearly communicate and understand verbal and written communication and follow directions correctly.  Treatment Explanation - The following has been discussed with the patient:   RX ordered/plan of care  This patient would benefit from PT intervention to resume normal activities.   Rehab potential is excellent.    Frequency:  1 X week, once daily  Duration:  for 8 weeks  Discharge Plan:  Achieve all LTG.  Independent in home treatment program.    Please refer to the daily flowsheet for treatment today, total treatment time and time spent performing 1:1 timed codes.

## 2022-10-02 ENCOUNTER — HEALTH MAINTENANCE LETTER (OUTPATIENT)
Age: 37
End: 2022-10-02

## 2022-12-22 PROBLEM — D18.09 HEMANGIOMA OF OTHER SITES: Status: RESOLVED | Noted: 2022-09-07 | Resolved: 2022-12-22

## 2022-12-22 PROBLEM — Z47.89 AFTERCARE FOLLOWING SURGERY OF THE MUSCULOSKELETAL SYSTEM: Status: RESOLVED | Noted: 2022-09-07 | Resolved: 2022-12-22

## 2022-12-22 PROBLEM — M25.561 ACUTE PAIN OF RIGHT KNEE: Status: RESOLVED | Noted: 2022-09-07 | Resolved: 2022-12-22

## 2023-10-21 ENCOUNTER — HEALTH MAINTENANCE LETTER (OUTPATIENT)
Age: 38
End: 2023-10-21

## 2024-12-14 ENCOUNTER — HEALTH MAINTENANCE LETTER (OUTPATIENT)
Age: 39
End: 2024-12-14

## (undated) DEVICE — DRAPE LAP W/ARMBOARD 29410

## (undated) DEVICE — SU MONOCRYL 4-0 PS-2 18" UND Y496G

## (undated) DEVICE — ESU GROUND PAD ADULT W/CORD E7507

## (undated) DEVICE — GLOVE PROTEXIS BLUE W/NEU-THERA 7.5  2D73EB75

## (undated) DEVICE — LINEN ORTHO PACK 5446

## (undated) DEVICE — PREP CHLORAPREP 26ML TINTED ORANGE  260815

## (undated) DEVICE — SUCTION MANIFOLD NEPTUNE 2 SYS 1 PORT 702-025-000

## (undated) DEVICE — SU VICRYL 2-0 CT-1 27" UND J259H

## (undated) DEVICE — GLOVE PROTEXIS BLUE W/NEU-THERA 8.0  2D73EB80

## (undated) DEVICE — GLOVE PROTEXIS POWDER FREE SMT 7.0  2D72PT70X

## (undated) DEVICE — GLOVE PROTEXIS W/NEU-THERA 7.5  2D73TE75

## (undated) DEVICE — PACK UNIVERSAL SPLIT 29131

## (undated) DEVICE — SOL NACL 0.9% IRRIG 500ML BOTTLE 2F7123

## (undated) RX ORDER — CEFAZOLIN SODIUM 2 G/50ML
SOLUTION INTRAVENOUS
Status: DISPENSED
Start: 2022-08-05

## (undated) RX ORDER — ONDANSETRON 2 MG/ML
INJECTION INTRAMUSCULAR; INTRAVENOUS
Status: DISPENSED
Start: 2022-08-05

## (undated) RX ORDER — GABAPENTIN 300 MG/1
CAPSULE ORAL
Status: DISPENSED
Start: 2022-08-05

## (undated) RX ORDER — ACETAMINOPHEN 325 MG/1
TABLET ORAL
Status: DISPENSED
Start: 2022-08-05

## (undated) RX ORDER — LIDOCAINE HYDROCHLORIDE 20 MG/ML
INJECTION, SOLUTION EPIDURAL; INFILTRATION; INTRACAUDAL; PERINEURAL
Status: DISPENSED
Start: 2022-08-05

## (undated) RX ORDER — FENTANYL CITRATE 50 UG/ML
INJECTION, SOLUTION INTRAMUSCULAR; INTRAVENOUS
Status: DISPENSED
Start: 2022-08-05

## (undated) RX ORDER — OXYCODONE HYDROCHLORIDE 5 MG/1
TABLET ORAL
Status: DISPENSED
Start: 2022-08-05

## (undated) RX ORDER — PROPOFOL 10 MG/ML
INJECTION, EMULSION INTRAVENOUS
Status: DISPENSED
Start: 2022-08-05

## (undated) RX ORDER — BUPIVACAINE HYDROCHLORIDE AND EPINEPHRINE 5; 5 MG/ML; UG/ML
INJECTION, SOLUTION EPIDURAL; INTRACAUDAL; PERINEURAL
Status: DISPENSED
Start: 2022-08-05

## (undated) RX ORDER — DEXAMETHASONE SODIUM PHOSPHATE 4 MG/ML
INJECTION, SOLUTION INTRA-ARTICULAR; INTRALESIONAL; INTRAMUSCULAR; INTRAVENOUS; SOFT TISSUE
Status: DISPENSED
Start: 2022-08-05

## (undated) RX ORDER — KETOROLAC TROMETHAMINE 30 MG/ML
INJECTION, SOLUTION INTRAMUSCULAR; INTRAVENOUS
Status: DISPENSED
Start: 2022-08-05

## (undated) RX ORDER — DEXMEDETOMIDINE HYDROCHLORIDE 4 UG/ML
INJECTION, SOLUTION INTRAVENOUS
Status: DISPENSED
Start: 2022-08-05